# Patient Record
Sex: MALE | Race: WHITE
[De-identification: names, ages, dates, MRNs, and addresses within clinical notes are randomized per-mention and may not be internally consistent; named-entity substitution may affect disease eponyms.]

---

## 2017-05-22 ENCOUNTER — HOSPITAL ENCOUNTER (OUTPATIENT)
Dept: HOSPITAL 53 - M RAD | Age: 32
End: 2017-05-22
Attending: OTOLARYNGOLOGY
Payer: COMMERCIAL

## 2017-05-22 DIAGNOSIS — G93.0: Primary | ICD-10-CM

## 2017-05-23 NOTE — REP
MAXILLOFACIAL CT WITHOUT CONTRAST:

 

HISTORY: Chronic sinusitis.

 

The sinuses are clear.  The ostiomeatal units are patent.  The middle and

inferior nasal turbinates are partially paradoxical.  There is rayray bullosa of

the left middle nasal turbinate.  The cribriform plate, medial walls of the

orbits and optic canals are intact.  The carotid canals form a segment of the

posterolateral walls of the sphenoid sinus.  The sphenoid sinus septa insert into

the internal carotid canal walls.  A small cyst  representing an arachnoid cyst

is present overlying the right temporal and parietal lobes.  The cyst measures

1.2 cm in transverse by 3 cm in AP dimensions.

 

IMPRESSION:

 

1.  There is no acute or chronic sinusitis.

 

2.  There is a small arachnoid cyst overlying the right temporal and parietal

lobes.  MRI of the brain may be helpful for further evaluation.

 

 

Signed by

Carlos Alaniz MD 05/23/2017 08:52 A

## 2021-03-12 ENCOUNTER — HOSPITAL ENCOUNTER (EMERGENCY)
Dept: HOSPITAL 53 - M ED | Age: 36
Discharge: HOME | End: 2021-03-12
Payer: COMMERCIAL

## 2021-03-12 VITALS — BODY MASS INDEX: 38.36 KG/M2 | HEIGHT: 76 IN | WEIGHT: 315 LBS

## 2021-03-12 VITALS — DIASTOLIC BLOOD PRESSURE: 91 MMHG | SYSTOLIC BLOOD PRESSURE: 160 MMHG

## 2021-03-12 DIAGNOSIS — R26.2: ICD-10-CM

## 2021-03-12 DIAGNOSIS — I10: ICD-10-CM

## 2021-03-12 DIAGNOSIS — E78.5: ICD-10-CM

## 2021-03-12 DIAGNOSIS — M54.41: Primary | ICD-10-CM

## 2021-03-12 PROCEDURE — 99282 EMERGENCY DEPT VISIT SF MDM: CPT

## 2021-03-12 PROCEDURE — 96372 THER/PROPH/DIAG INJ SC/IM: CPT

## 2021-12-13 ENCOUNTER — HOSPITAL ENCOUNTER (OUTPATIENT)
Dept: HOSPITAL 53 - M OPCLI4PR | Age: 36
Discharge: HOME | End: 2021-12-13
Attending: PHYSICIAN ASSISTANT
Payer: COMMERCIAL

## 2021-12-13 VITALS — HEIGHT: 76 IN | WEIGHT: 315 LBS | BODY MASS INDEX: 38.36 KG/M2

## 2021-12-13 VITALS — DIASTOLIC BLOOD PRESSURE: 77 MMHG | SYSTOLIC BLOOD PRESSURE: 145 MMHG

## 2021-12-13 VITALS — SYSTOLIC BLOOD PRESSURE: 143 MMHG | DIASTOLIC BLOOD PRESSURE: 73 MMHG

## 2021-12-13 VITALS — SYSTOLIC BLOOD PRESSURE: 132 MMHG | DIASTOLIC BLOOD PRESSURE: 70 MMHG

## 2021-12-13 VITALS — DIASTOLIC BLOOD PRESSURE: 73 MMHG | SYSTOLIC BLOOD PRESSURE: 142 MMHG

## 2021-12-13 DIAGNOSIS — U07.1: Primary | ICD-10-CM
